# Patient Record
Sex: FEMALE | Race: WHITE | NOT HISPANIC OR LATINO | Employment: FULL TIME | URBAN - METROPOLITAN AREA
[De-identification: names, ages, dates, MRNs, and addresses within clinical notes are randomized per-mention and may not be internally consistent; named-entity substitution may affect disease eponyms.]

---

## 2017-12-22 ENCOUNTER — APPOINTMENT (OUTPATIENT)
Dept: WOMENS IMAGING | Facility: HOSPITAL | Age: 47
End: 2017-12-22

## 2017-12-22 PROCEDURE — 77067 SCR MAMMO BI INCL CAD: CPT | Performed by: RADIOLOGY

## 2017-12-22 PROCEDURE — 77063 BREAST TOMOSYNTHESIS BI: CPT | Performed by: RADIOLOGY

## 2019-03-15 ENCOUNTER — APPOINTMENT (OUTPATIENT)
Dept: WOMENS IMAGING | Facility: HOSPITAL | Age: 49
End: 2019-03-15

## 2019-03-15 PROCEDURE — 77063 BREAST TOMOSYNTHESIS BI: CPT | Performed by: RADIOLOGY

## 2019-03-15 PROCEDURE — 77067 SCR MAMMO BI INCL CAD: CPT | Performed by: RADIOLOGY

## 2020-01-09 ENCOUNTER — OFFICE VISIT CONVERTED (OUTPATIENT)
Dept: ORTHOPEDIC SURGERY | Facility: CLINIC | Age: 50
End: 2020-01-09
Attending: ORTHOPAEDIC SURGERY

## 2020-10-23 ENCOUNTER — APPOINTMENT (OUTPATIENT)
Dept: WOMENS IMAGING | Facility: HOSPITAL | Age: 50
End: 2020-10-23

## 2020-10-23 PROCEDURE — 77063 BREAST TOMOSYNTHESIS BI: CPT | Performed by: RADIOLOGY

## 2020-10-23 PROCEDURE — 77067 SCR MAMMO BI INCL CAD: CPT | Performed by: RADIOLOGY

## 2021-05-15 VITALS — RESPIRATION RATE: 16 BRPM | WEIGHT: 200 LBS | BODY MASS INDEX: 30.31 KG/M2 | HEIGHT: 68 IN

## 2022-01-04 ENCOUNTER — APPOINTMENT (OUTPATIENT)
Dept: WOMENS IMAGING | Facility: HOSPITAL | Age: 52
End: 2022-01-04

## 2022-01-04 PROCEDURE — 77067 SCR MAMMO BI INCL CAD: CPT | Performed by: RADIOLOGY

## 2022-01-04 PROCEDURE — 77063 BREAST TOMOSYNTHESIS BI: CPT | Performed by: RADIOLOGY

## 2022-02-21 ENCOUNTER — APPOINTMENT (OUTPATIENT)
Dept: WOMENS IMAGING | Facility: HOSPITAL | Age: 52
End: 2022-02-21

## 2022-02-21 PROCEDURE — 77065 DX MAMMO INCL CAD UNI: CPT | Performed by: RADIOLOGY

## 2022-02-21 PROCEDURE — 77061 BREAST TOMOSYNTHESIS UNI: CPT | Performed by: RADIOLOGY

## 2022-02-21 PROCEDURE — 76641 ULTRASOUND BREAST COMPLETE: CPT | Performed by: RADIOLOGY

## 2022-02-21 PROCEDURE — G0279 TOMOSYNTHESIS, MAMMO: HCPCS | Performed by: RADIOLOGY

## 2022-06-01 ENCOUNTER — OFFICE VISIT (OUTPATIENT)
Dept: ORTHOPEDIC SURGERY | Facility: CLINIC | Age: 52
End: 2022-06-01

## 2022-06-01 VITALS — OXYGEN SATURATION: 98 % | HEART RATE: 87 BPM | HEIGHT: 69 IN | WEIGHT: 217.6 LBS | BODY MASS INDEX: 32.23 KG/M2

## 2022-06-01 DIAGNOSIS — M70.61 TROCHANTERIC BURSITIS OF RIGHT HIP: ICD-10-CM

## 2022-06-01 DIAGNOSIS — M54.31 SCIATICA OF RIGHT SIDE: ICD-10-CM

## 2022-06-01 DIAGNOSIS — M25.551 RIGHT HIP PAIN: Primary | ICD-10-CM

## 2022-06-01 PROCEDURE — 99213 OFFICE O/P EST LOW 20 MIN: CPT | Performed by: ORTHOPAEDIC SURGERY

## 2022-06-01 PROCEDURE — 20610 DRAIN/INJ JOINT/BURSA W/O US: CPT | Performed by: ORTHOPAEDIC SURGERY

## 2022-06-01 RX ORDER — METFORMIN HYDROCHLORIDE 500 MG/1
TABLET, EXTENDED RELEASE ORAL
COMMUNITY
Start: 2022-05-27

## 2022-06-01 RX ORDER — LISINOPRIL 5 MG/1
TABLET ORAL
COMMUNITY
Start: 2022-05-27

## 2022-06-01 RX ORDER — DULAGLUTIDE 1.5 MG/.5ML
INJECTION, SOLUTION SUBCUTANEOUS
COMMUNITY
Start: 2022-05-27

## 2022-06-01 RX ORDER — LIDOCAINE HYDROCHLORIDE 10 MG/ML
9 INJECTION, SOLUTION INFILTRATION; PERINEURAL
Status: COMPLETED | OUTPATIENT
Start: 2022-06-01 | End: 2022-06-01

## 2022-06-01 RX ORDER — PANTOPRAZOLE SODIUM 40 MG/1
TABLET, DELAYED RELEASE ORAL
COMMUNITY
Start: 2022-05-27

## 2022-06-01 RX ORDER — TRIAMCINOLONE ACETONIDE 40 MG/ML
40 INJECTION, SUSPENSION INTRA-ARTICULAR; INTRAMUSCULAR
Status: COMPLETED | OUTPATIENT
Start: 2022-06-01 | End: 2022-06-01

## 2022-06-01 RX ORDER — DAPAGLIFLOZIN 10 MG/1
TABLET, FILM COATED ORAL
COMMUNITY
Start: 2022-05-27

## 2022-06-01 RX ORDER — ESCITALOPRAM OXALATE 20 MG/1
TABLET ORAL
COMMUNITY
Start: 2022-05-27

## 2022-06-01 RX ADMIN — LIDOCAINE HYDROCHLORIDE 9 ML: 10 INJECTION, SOLUTION INFILTRATION; PERINEURAL at 14:02

## 2022-06-01 RX ADMIN — TRIAMCINOLONE ACETONIDE 40 MG: 40 INJECTION, SUSPENSION INTRA-ARTICULAR; INTRAMUSCULAR at 14:02

## 2022-06-01 NOTE — PROGRESS NOTES
"Chief Complaint  Initial Evaluation of the Right Hip     Subjective      Didi Calero presents to [facility] for an evaluation of right hip. She has pain on the lateral hip, sometimes it comes posteriorly and down the thigh. She denies any back pain and groin pain today. She denies any injury or trauma.     Allergies   Allergen Reactions   • Penicillins Hives        Social History     Socioeconomic History   • Marital status: Unknown   Tobacco Use   • Smoking status: Never Smoker   • Smokeless tobacco: Never Used        Review of Systems     Objective   Vital Signs:   Pulse 87   Ht 174 cm (68.5\")   Wt 98.7 kg (217 lb 9.6 oz)   SpO2 98%   BMI 32.60 kg/m²       Physical Exam  Constitutional:       Appearance: Normal appearance. Patient is well-developed and normal weight.   HENT:      Head: Normocephalic.      Right Ear: Hearing and external ear normal.      Left Ear: Hearing and external ear normal.      Nose: Nose normal.   Eyes:      Conjunctiva/sclera: Conjunctivae normal.   Cardiovascular:      Rate and Rhythm: Normal rate.   Pulmonary:      Effort: Pulmonary effort is normal.      Breath sounds: No wheezing or rales.   Abdominal:      Palpations: Abdomen is soft.      Tenderness: There is no abdominal tenderness.   Musculoskeletal:      Cervical back: Normal range of motion.   Skin:     Findings: No rash.   Neurological:      Mental Status: Patient  is alert and oriented to person, place, and time.   Psychiatric:         Mood and Affect: Mood and affect normal.         Judgment: Judgment normal.       Ortho Exam      RIGHT HIP: Good tone of hip flexors, hip extensors, hip adductor, hip abductors. Sensation grossly intact. Neurovascular intact.  Calf soft. Tender greater trochanter. No groin pain. Good passive hip motion.       Large Joint Arthrocentesis: R greater trochanteric bursa  Date/Time: 6/1/2022 2:02 PM  Consent given by: patient  Site marked: site marked  Timeout: " Immediately prior to procedure a time out was called to verify the correct patient, procedure, equipment, support staff and site/side marked as required   Supporting Documentation  Indications: pain   Procedure Details  Location: hip - R greater trochanteric bursa  Needle size: 22 G  Medications administered: 9 mL lidocaine 1 %; 40 mg triamcinolone acetonide 40 MG/ML  Patient tolerance: patient tolerated the procedure well with no immediate complications              Imaging Results (Most Recent)     Procedure Component Value Units Date/Time    XR Hip With or Without Pelvis 2 - 3 View Right [[id number]] Resulted: 06/01/22 1331     Updated: 06/01/22 1338           Result Review :     X-Ray Report:  Right hip(s) X-Ray  Indication: Evaluation of right hip pain   AP and Lateral view(s)  Findings: No significant arthritis of the hip. No fractures or dislocation.   Prior studies available for comparison: no     Assessment and Plan     Diagnoses and all orders for this visit:    1. Right hip pain (Primary)  -     XR Hip With or Without Pelvis 2 - 3 View Right    2. Trochanteric bursitis of right hip    3. Sciatica of right side        Continue taking Advil as needed. Risks and benefits of steroid injection discussed. She opted to try an injection. She tolerated hip bursa injection well. She was given a prescription for PT. At home exercises provided for the patient.     Call or return if worsening symptoms.    Follow Up     4-6 weeks if failing to improve.       Patient was given instructions and counseling regarding her condition or for health maintenance advice. Please see specific information pulled into the AVS if appropriate.     Scribed for Jose Manuel Lu MD by Andreia Nelson.  06/01/22   13:55 EDT      I have personally performed the services described in this document as scribed by the above individual and it is both accurate and complete. Jose Manuel Lu MD 06/01/22

## 2022-08-11 VITALS
RESPIRATION RATE: 10 BRPM | SYSTOLIC BLOOD PRESSURE: 141 MMHG | DIASTOLIC BLOOD PRESSURE: 84 MMHG | DIASTOLIC BLOOD PRESSURE: 55 MMHG | HEART RATE: 74 BPM | SYSTOLIC BLOOD PRESSURE: 151 MMHG | SYSTOLIC BLOOD PRESSURE: 131 MMHG | RESPIRATION RATE: 16 BRPM | RESPIRATION RATE: 17 BRPM | SYSTOLIC BLOOD PRESSURE: 137 MMHG | HEART RATE: 69 BPM | HEART RATE: 76 BPM | TEMPERATURE: 97.2 F | HEART RATE: 73 BPM | DIASTOLIC BLOOD PRESSURE: 72 MMHG | RESPIRATION RATE: 13 BRPM | HEART RATE: 75 BPM | DIASTOLIC BLOOD PRESSURE: 86 MMHG | TEMPERATURE: 96.6 F | DIASTOLIC BLOOD PRESSURE: 90 MMHG | SYSTOLIC BLOOD PRESSURE: 130 MMHG | OXYGEN SATURATION: 100 % | HEART RATE: 83 BPM | RESPIRATION RATE: 14 BRPM | DIASTOLIC BLOOD PRESSURE: 81 MMHG | SYSTOLIC BLOOD PRESSURE: 136 MMHG | HEART RATE: 78 BPM | SYSTOLIC BLOOD PRESSURE: 138 MMHG | RESPIRATION RATE: 12 BRPM | DIASTOLIC BLOOD PRESSURE: 77 MMHG | HEART RATE: 80 BPM | SYSTOLIC BLOOD PRESSURE: 134 MMHG | OXYGEN SATURATION: 99 % | HEIGHT: 68 IN | DIASTOLIC BLOOD PRESSURE: 89 MMHG | OXYGEN SATURATION: 95 % | DIASTOLIC BLOOD PRESSURE: 80 MMHG | WEIGHT: 190 LBS | SYSTOLIC BLOOD PRESSURE: 150 MMHG

## 2022-08-15 ENCOUNTER — AMBULATORY SURGICAL CENTER (AMBULATORY)
Dept: URBAN - METROPOLITAN AREA SURGERY 17 | Facility: SURGERY | Age: 52
End: 2022-08-15
Payer: COMMERCIAL

## 2022-08-15 ENCOUNTER — OFFICE (AMBULATORY)
Dept: URBAN - METROPOLITAN AREA PATHOLOGY 4 | Facility: PATHOLOGY | Age: 52
End: 2022-08-15
Payer: COMMERCIAL

## 2022-08-15 DIAGNOSIS — K63.5 POLYP OF COLON: ICD-10-CM

## 2022-08-15 DIAGNOSIS — D12.8 BENIGN NEOPLASM OF RECTUM: ICD-10-CM

## 2022-08-15 DIAGNOSIS — Z12.11 ENCOUNTER FOR SCREENING FOR MALIGNANT NEOPLASM OF COLON: ICD-10-CM

## 2022-08-15 DIAGNOSIS — D12.4 BENIGN NEOPLASM OF DESCENDING COLON: ICD-10-CM

## 2022-08-15 DIAGNOSIS — Z83.71 FAMILY HISTORY OF COLONIC POLYPS: ICD-10-CM

## 2022-08-15 DIAGNOSIS — D12.3 BENIGN NEOPLASM OF TRANSVERSE COLON: ICD-10-CM

## 2022-08-15 PROBLEM — K62.1 RECTAL POLYP: Status: ACTIVE | Noted: 2022-08-15

## 2022-08-15 LAB
GI HISTOLOGY: A. UNSPECIFIED: (no result)
GI HISTOLOGY: B. UNSPECIFIED: (no result)
GI HISTOLOGY: C. UNSPECIFIED: (no result)
GI HISTOLOGY: D. UNSPECIFIED: (no result)
GI HISTOLOGY: PDF REPORT: (no result)

## 2022-08-15 PROCEDURE — 88305 TISSUE EXAM BY PATHOLOGIST: CPT | Performed by: INTERNAL MEDICINE

## 2022-08-15 PROCEDURE — 45385 COLONOSCOPY W/LESION REMOVAL: CPT | Mod: 33 | Performed by: INTERNAL MEDICINE

## 2022-08-15 NOTE — SERVICEHPINOTES
JULIETA KHAN  is a  52  female   who presents today for a  Colonoscopy   for   the indications listed below. The updated Patient Profile was reviewed prior to the procedure, in conjunction with the Physical Exam, including medical conditions, surgical procedures, medications, allergies, family history and social history. See Physical Exam time stamp below for date and time of HPI completion.Pre-operatively, I reviewed the indication(s) for the procedure, the risks of the procedure [including but not limited to: unexpected bleeding possibly requiring hospitalization and/or unplanned repeat procedures, perforation possibly requiring surgical treatment, missed lesions and complications of sedation/MAC (also explained by anesthesia staff)]. I have evaluated the patient for risks associated with the planned anesthesia and the procedure to be performed and find the patient an acceptable candidate for IV sedation.Multiple opportunities were provided for any questions or concerns, and all questions were answered satisfactorily before any anesthesia was administered. We will proceed with the planned procedure.br

## 2022-10-10 ENCOUNTER — TELEPHONE (OUTPATIENT)
Dept: ORTHOPEDIC SURGERY | Facility: CLINIC | Age: 52
End: 2022-10-10

## 2022-10-10 NOTE — TELEPHONE ENCOUNTER
RIGHT CLOSED DISPLACES FX/ OF PROXIMAL PHALANX LITTLE FINGER/ NO SX-WC-AUTO/ URGENT CARE 10-/ [facility]IGU[facility] ROACH/

## 2022-10-12 ENCOUNTER — OFFICE VISIT (OUTPATIENT)
Dept: ORTHOPEDIC SURGERY | Facility: CLINIC | Age: 52
End: 2022-10-12

## 2022-10-12 VITALS — BODY MASS INDEX: 32.28 KG/M2 | OXYGEN SATURATION: 97 % | HEART RATE: 83 BPM | HEIGHT: 68 IN | WEIGHT: 213 LBS

## 2022-10-12 DIAGNOSIS — S62.646A CLOSED NONDISPLACED FRACTURE OF PROXIMAL PHALANX OF RIGHT LITTLE FINGER, INITIAL ENCOUNTER: Primary | ICD-10-CM

## 2022-10-12 PROCEDURE — 99214 OFFICE O/P EST MOD 30 MIN: CPT | Performed by: PHYSICIAN ASSISTANT

## 2022-10-12 PROCEDURE — 26720 TREAT FINGER FRACTURE EACH: CPT | Performed by: PHYSICIAN ASSISTANT

## 2022-10-12 NOTE — PROGRESS NOTES
"Chief Complaint  Pain and Initial Evaluation of the Right Hand    Subjective      Didi Calero presents to [facility] for evaluation of right hand.  Patient states that on 10/05/2022, she was in [facility], slid and fell onto the right hand.  She reports having pain along the pinky when this occurred.  She states she taped her digits and took Advil until she was able to come back to [place].  On 10/09/2022, patient went to urgent care and had imaging obtained revealing a mildly displaced fracture of the fifth proximal phalanx.  She is present today in ulnar gutter splint.  She reports pain is well managed.  She denies numbness or tingling.    Objective   Allergies   Allergen Reactions   • Penicillins Hives       Vital Signs:   Pulse 83   Ht 172.7 cm (68\")   Wt 96.6 kg (213 lb)   SpO2 97%   BMI 32.39 kg/m²       Physical Exam    Constitutional: Awake, alert. Well nourished appearance.    Integumentary: Warm, dry, intact. No obvious rashes.    HENT: Atraumatic, normocephalic.   Respiratory: Non labored respirations .   Cardiovascular: Intact peripheral pulses.    Psychiatric: Normal mood and affect. A&O X3    Ortho Exam  Right hand: Bruising and mild swelling to the little finger, tenderness to palpate little finger.  Abrasions to ulnar side of wrist.  Full wrist flexion and extension.  Nontender to the wrist.  Good thumb opposition.  Sensation intact.  Neurovascular intact distally.    Imaging Results (Most Recent)     None           Orthopedic Injury Treatment    Date/Time: 10/12/2022 3:05 PM  Performed by: Jacque West PA  Authorized by: Jacque West PA   Injury location: hand  Location details: right hand  Splint type: ulnar gutter  Supplies used: cotton padding (fiberglass)  Patient tolerance: patient tolerated the procedure well with no immediate complications  Comments: Closed treatment was obtained and fiberglass cast was applied.  The patient " tolerated the procedure without any complications.  Applied by Sarika REYES                Assessment and Plan   Problem List Items Addressed This Visit        Musculoskeletal and Injuries    Closed nondisplaced fracture of proximal phalanx of right little finger - Primary       Follow Up   Return in about 2 weeks (around 10/26/2022).    Patient Instructions   X-rays reviewed today. Patient placed into ulnar gutter short arm cast.     Educated on cast care.     Follow up in 2 weeks. Repeat imaging out of cast.    Patient was given instructions and counseling regarding her condition or for health maintenance advice. Please see specific information pulled into the AVS if appropriate.

## 2022-10-12 NOTE — PATIENT INSTRUCTIONS
X-rays reviewed today. Patient placed into ulnar gutter short arm cast.     Educated on cast care.     Follow up in 2 weeks. Repeat imaging out of cast.

## 2022-10-31 ENCOUNTER — OFFICE VISIT (OUTPATIENT)
Dept: ORTHOPEDIC SURGERY | Facility: CLINIC | Age: 52
End: 2022-10-31

## 2022-10-31 VITALS — WEIGHT: 215.2 LBS | HEART RATE: 84 BPM | BODY MASS INDEX: 32.61 KG/M2 | OXYGEN SATURATION: 98 % | HEIGHT: 68 IN

## 2022-10-31 DIAGNOSIS — M79.641 RIGHT HAND PAIN: Primary | ICD-10-CM

## 2022-10-31 DIAGNOSIS — S62.646A CLOSED NONDISPLACED FRACTURE OF PROXIMAL PHALANX OF RIGHT LITTLE FINGER, INITIAL ENCOUNTER: ICD-10-CM

## 2022-10-31 PROCEDURE — 99024 POSTOP FOLLOW-UP VISIT: CPT | Performed by: PHYSICIAN ASSISTANT

## 2022-10-31 NOTE — PROGRESS NOTES
"Chief Complaint  Follow-up of the Right Hand    Subjective      Didi Calero presents to [facility] for follow up of right hand. Patient sustained fracture of the fifth proximal phalanx after slipping and falling while visiting in [place]. She has been in an ulnar gutter short arm cast since initial visit to clinic on 10/12/22. After cast removal, she reports stiffness but denies pain of the hand or digits. She is a  and has tolerated her work duties well.    Objective   Allergies   Allergen Reactions   • Penicillins Hives       Vital Signs:   Pulse 84   Ht 172.7 cm (68\")   Wt 97.6 kg (215 lb 3.2 oz)   SpO2 98%   BMI 32.72 kg/m²       Physical Exam    Constitutional: Awake, alert. Well nourished appearance.    Integumentary: Warm, dry, intact. No obvious rashes.    HENT: Atraumatic, normocephalic.   Respiratory: Non labored respirations .   Cardiovascular: Intact peripheral pulses.    Psychiatric: Normal mood and affect. A&O X3    Ortho Exam  RIGHT HAND: Mild tenderness to the fifth digits. Skin dry, intact. No discoloration. Stiffness of the digits. Good thumb opposition. Good muscle tone of wrist flexors and extensors. Sensation is intact. Neurovascular intact distally.     Imaging Results (Most Recent)     Procedure Component Value Units Date/Time    XR Hand 2 View Right [[id number]] Resulted: 10/31/22 1603     Updated: 10/31/22 1604    Narrative:      X-Ray Report:  Study: X-rays ordered, taken in the office, and reviewed today  Site: right hand Xray  Indication: Fracture   View: AP/Lateral view(s)  Findings: Well healing fracture at the base of the fifth proximal phalanx.     Prior studies available for comparison: yes                      Assessment and Plan   Problem List Items Addressed This Visit        Musculoskeletal and Injuries    Closed nondisplaced fracture of proximal phalanx of right little finger   Other Visit Diagnoses     Right hand pain    " -  Primary    Relevant Orders    XR Hand 2 View Right (Completed)          Follow Up   Return in about 3 weeks (around 11/21/2022).    Patient Instructions   Patient has allergy to Neoprene - tape provided to tape the fingers at school. Able to remove outside of school and work on motion of the digits, making a fist and using stress ball. Avoid heavy lifting, pushing or pulling at this time.      Follow up in 3 weeks. Repeat imaging.    Patient was given instructions and counseling regarding her condition or for health maintenance advice. Please see specific information pulled into the AVS if appropriate.

## 2022-10-31 NOTE — PATIENT INSTRUCTIONS
Patient has allergy to Neoprene - tape provided to tape the fingers at school. Able to remove outside of school and work on motion of the digits, making a fist and using stress ball. Avoid heavy lifting, pushing or pulling at this time.      Follow up in 3 weeks. Repeat imaging.

## 2022-11-23 ENCOUNTER — OFFICE VISIT (OUTPATIENT)
Dept: ORTHOPEDIC SURGERY | Facility: CLINIC | Age: 52
End: 2022-11-23

## 2022-11-23 VITALS — WEIGHT: 215 LBS | HEIGHT: 68 IN | BODY MASS INDEX: 32.58 KG/M2

## 2022-11-23 DIAGNOSIS — S62.646A CLOSED NONDISPLACED FRACTURE OF PROXIMAL PHALANX OF RIGHT LITTLE FINGER, INITIAL ENCOUNTER: Primary | ICD-10-CM

## 2022-11-23 PROCEDURE — 99024 POSTOP FOLLOW-UP VISIT: CPT | Performed by: PHYSICIAN ASSISTANT

## 2022-11-23 NOTE — PATIENT INSTRUCTIONS
X-rays taken and reviewed today.  Patient stiffness has improved and x-ray reveals good healing.  Follow-up with any changes or concerns.

## 2024-05-01 NOTE — PROGRESS NOTES
"Chief Complaint  Pain and Follow-up of the Right Hand    Subjective      Didi Calero presents to [facility] for follow-up of right hand.  Patient sustained fifth proximal phalanx fracture on 10/12/2022.  She was treated nonoperatively in an ulnar gutter cast x3 weeks.  She reports improvement of her pain.  She reports mild swelling of the digit still.  Denies stiffness.  She is returned to work as a  and tolerating work duties well.    Objective   Allergies   Allergen Reactions   • Penicillins Hives       Vital Signs:   Ht 172.7 cm (68\")   Wt 97.5 kg (215 lb)   BMI 32.69 kg/m²       Physical Exam    Constitutional: Awake, alert. Well nourished appearance.    Integumentary: Warm, dry, intact. No obvious rashes.    HENT: Atraumatic, normocephalic.   Respiratory: Non labored respirations .   Cardiovascular: Intact peripheral pulses.    Psychiatric: Normal mood and affect. A&O X3    Ortho Exam  Right hand: Mild swelling along the little finger.  Full finger abduction adduction.  Able to form complete fist.  FDS/FDP intact.  Good thumb opposition.  Good muscle tone the wrist flexors and extensors.  Sensation grossly intact.  Neurovascular intact distally.    Imaging Results (Most Recent)     Procedure Component Value Units Date/Time    XR Hand 2 View Right [[id number]] Resulted: 11/23/22 1059     Updated: 11/23/22 1059    Narrative:      X-Ray Report:  Study: X-rays ordered, taken in the office, and reviewed today  Site: right hand Xray  Indication: fracture   View: AP/Lateral view(s)  Findings: Good bone healing of fracture of the fifth proximal phalanx.   Prior studies available for comparison: yes                      Assessment and Plan   Problem List Items Addressed This Visit        Musculoskeletal and Injuries    Closed nondisplaced fracture of proximal phalanx of right little finger - Primary       Follow Up   Return if symptoms worsen or fail to " improve.      Patient Instructions   X-rays taken and reviewed today.  Patient stiffness has improved and x-ray reveals good healing.  Follow-up with any changes or concerns.    Patient was given instructions and counseling regarding her condition or for health maintenance advice. Please see specific information pulled into the AVS if appropriate.         Opt out

## 2024-09-19 ENCOUNTER — APPOINTMENT (OUTPATIENT)
Dept: WOMENS IMAGING | Facility: HOSPITAL | Age: 54
End: 2024-09-19
Payer: COMMERCIAL

## 2024-09-19 PROCEDURE — 77067 SCR MAMMO BI INCL CAD: CPT | Performed by: RADIOLOGY

## 2024-09-19 PROCEDURE — 77063 BREAST TOMOSYNTHESIS BI: CPT | Performed by: RADIOLOGY

## 2025-07-30 ENCOUNTER — TRANSCRIBE ORDERS (OUTPATIENT)
Dept: ADMINISTRATIVE | Facility: HOSPITAL | Age: 55
End: 2025-07-30
Payer: COMMERCIAL

## 2025-07-30 DIAGNOSIS — E04.1 THYROID NODULE: Primary | ICD-10-CM

## 2025-08-01 ENCOUNTER — HOSPITAL ENCOUNTER (OUTPATIENT)
Dept: ULTRASOUND IMAGING | Facility: HOSPITAL | Age: 55
Discharge: HOME | End: 2025-08-01
Admitting: NURSE PRACTITIONER
Payer: COMMERCIAL

## 2025-08-01 DIAGNOSIS — E04.1 THYROID NODULE: ICD-10-CM

## 2025-08-01 PROCEDURE — 76536 US EXAM OF HEAD AND NECK: CPT

## 2025-08-22 ENCOUNTER — OFFICE VISIT (OUTPATIENT)
Dept: ORTHOPEDIC SURGERY | Facility: CLINIC | Age: 55
End: 2025-08-22
Payer: COMMERCIAL

## 2025-08-22 VITALS
HEART RATE: 83 BPM | BODY MASS INDEX: 32.58 KG/M2 | WEIGHT: 215 LBS | OXYGEN SATURATION: 98 % | SYSTOLIC BLOOD PRESSURE: 120 MMHG | HEIGHT: 68 IN | DIASTOLIC BLOOD PRESSURE: 69 MMHG

## 2025-08-22 DIAGNOSIS — M25.551 PAIN OF RIGHT HIP: Primary | ICD-10-CM

## 2025-08-22 DIAGNOSIS — M70.61 TROCHANTERIC BURSITIS OF RIGHT HIP: ICD-10-CM

## 2025-08-22 RX ORDER — LIDOCAINE HYDROCHLORIDE 10 MG/ML
5 INJECTION, SOLUTION INFILTRATION; PERINEURAL
Status: COMPLETED | OUTPATIENT
Start: 2025-08-22 | End: 2025-08-22

## 2025-08-22 RX ORDER — TIRZEPATIDE 12.5 MG/.5ML
INJECTION, SOLUTION SUBCUTANEOUS
COMMUNITY
Start: 2025-07-29

## 2025-08-22 RX ORDER — TRIAMCINOLONE ACETONIDE 40 MG/ML
40 INJECTION, SUSPENSION INTRA-ARTICULAR; INTRAMUSCULAR
Status: COMPLETED | OUTPATIENT
Start: 2025-08-22 | End: 2025-08-22

## 2025-08-22 RX ADMIN — LIDOCAINE HYDROCHLORIDE 5 ML: 10 INJECTION, SOLUTION INFILTRATION; PERINEURAL at 16:23

## 2025-08-22 RX ADMIN — TRIAMCINOLONE ACETONIDE 40 MG: 40 INJECTION, SUSPENSION INTRA-ARTICULAR; INTRAMUSCULAR at 16:23
